# Patient Record
(demographics unavailable — no encounter records)

---

## 2024-10-09 NOTE — PHYSICAL EXAM
[Well Developed] : well developed [Well Nourished] : well nourished [No Acute Distress] : no acute distress [Normal Conjunctiva] : normal conjunctiva [Normal Venous Pressure] : normal venous pressure [No Carotid Bruit] : no carotid bruit [Normal S1, S2] : normal S1, S2 [No Murmur] : no murmur [No Rub] : no rub [No Gallop] : no gallop [Irregularly Irregular] : irregularly irregular [Nonpitting Edema] : nonpitting edema present [Rt] : varicose veins of the right leg noted [Lt] : varicose veins of the left leg noted [Clear Lung Fields] : clear lung fields [Good Air Entry] : good air entry [No Respiratory Distress] : no respiratory distress  [Soft] : abdomen soft [Non Tender] : non-tender [No Masses/organomegaly] : no masses/organomegaly [Normal Bowel Sounds] : normal bowel sounds [Normal Gait] : normal gait [No Edema] : no edema [No Cyanosis] : no cyanosis [No Clubbing] : no clubbing [No Varicosities] : no varicosities [No Rash] : no rash [No Skin Lesions] : no skin lesions [Moves all extremities] : moves all extremities [No Focal Deficits] : no focal deficits [Normal Speech] : normal speech [Alert and Oriented] : alert and oriented [Normal memory] : normal memory

## 2024-10-09 NOTE — HISTORY OF PRESENT ILLNESS
[FreeTextEntry1] : Tiffanie presented to the office today for a cardiovascular evaluation.  She was last seen in the office in May.  She is now 75 years old, with a history of permanent atrial fibrillation.  She has a Medtronic pacemaker in VVI mode.  She has a history of minimal luminal irregularities of her coronary arteries based on cardiac catheterization in 2011.  She has mild to moderate valvular insufficiency.  She has a history of obesity with associated prediabetes and lower extremity edema.  She was evaluated by her primary care physician May 16, 2024.  She had been to the [facility] emergency department because of right lower quadrant discomfort.  She was not felt to have any active cardiovascular issues.  Her hypertension and atrial fibrillation appeared controlled, and medications were continued.  She more recently presented again to [facility] with several complaints including dizziness, headache and palpitations.  She had extensive evaluation including CT scanning of the chest, carotid sonography.  Her evaluation was unremarkable, and she was ultimately discharged with a diagnosis of possible dehydration.  At baseline, she tries to stay physically active.  Her activities are somewhat limited by knee issues and obesity.  With regular physical activities, she feels well, without reproducible chest discomfort or shortness of breath suggestive of angina.  She denies orthopnea, PND.  She has a tendency toward lower extremity edema attributable to venous insufficiency.  She tries to use elevation and takes furosemide occasionally.  She denies palpitations, dizziness and syncope.She retired in June.  She is planned for left TKR in December, 2024. She is also planned for a generator change October 30.

## 2024-10-09 NOTE — DISCUSSION/SUMMARY
[FreeTextEntry1] : Lipid profile from April 2024 revealed a total cholesterol of 174, HDL 60, triglycerides 87, calculated LDL 98.  Echocardiography was performed April 5, 2024.  This revealed a normal ejection fraction with mild mitral and mild to moderate tricuspid regurgitation.  Device interrogation from September, 2024 revealed normal device function with estimated battery longevity of 11 months.  For now, I do not think any changes are needed.  I do not think any additional testing is needed at this time.  She is optimized for her planned generator change.  Holding Eliquis will be at the discretion of electrophysiology.  She will be seeing me again prior to her planned knee replacement. [EKG obtained to assist in diagnosis and management of assessed problem(s)] : EKG obtained to assist in diagnosis and management of assessed problem(s)

## 2024-11-11 NOTE — HISTORY OF PRESENT ILLNESS
[FreeTextEntry1] : Tiffanie presented to the office today for a cardiovascular evaluation.  She was last seen in the office about 1 month ago.  She is now 75 years old, with a history of permanent atrial fibrillation.  She has a Medtronic pacemaker in VVI mode.  She has a history of minimal luminal irregularities of her coronary arteries based on cardiac catheterization in 2011.  She has mild to moderate valvular insufficiency.  She has a history of obesity with associated prediabetes and lower extremity edema.  She was evaluated by her primary care physician May 16, 2024.  She had been to the [facility] emergency department because of right lower quadrant discomfort.  She was not felt to have any active cardiovascular issues.  Her hypertension and atrial fibrillation appeared controlled, and medications were continued.  She more recently presented again to [facility] with several complaints including dizziness, headache and palpitations.  She had extensive evaluation including CT scanning of the chest, carotid sonography.  Her evaluation was unremarkable, and she was ultimately discharged with a diagnosis of possible dehydration.  I saw her in October 2024, feeling well.  She was planned for generator change on October 30, 2024.  This was ultimately performed without complication, and she now has an MRI compatible device.  She now presents in anticipation of left total knee replacement in December, 2024.  At baseline, she tries to stay physically active.  Her activities are limited by knee issues and obesity.  With regular physical activities, she feels well, without reproducible chest discomfort or shortness of breath suggestive of angina.  She denies orthopnea, PND.  She has a tendency toward lower extremity edema attributable to venous insufficiency.  She tries to use elevation and takes furosemide occasionally.  She denies palpitations, dizziness and syncope. She retired in June.

## 2024-11-11 NOTE — CARDIOLOGY SUMMARY
[de-identified] : May 30, 2024 atrial fibrillation October 9, 2024 atrial fibrillation November 11, 2024 atrial fibrillation, backup ventricular pacing

## 2024-11-11 NOTE — DISCUSSION/SUMMARY
[FreeTextEntry1] : Lipid profile from April 2024 revealed a total cholesterol of 174, HDL 60, triglycerides 87, calculated LDL 98.  Echocardiography was performed April 5, 2024.  This revealed a normal ejection fraction with mild mitral and mild to moderate tricuspid regurgitation.  Coronary angiography was performed in 2011 revealing no significant coronary artery disease.  For now, I do not think any changes are needed.  I do not think ischemic testing is necessary at this time, acknowledging that her exercise capacity exceeds 4 metabolic equivalents limited by noncardiac symptoms, and only by orthopedic issues.   I consider her optimized for her planned knee replacement.  Routine hemodynamic monitoring is recommended.  She may hold Eliquis for 2 days prior to the procedure, and resume it when it is safe to do so from a surgical perspective. [EKG obtained to assist in diagnosis and management of assessed problem(s)] : EKG obtained to assist in diagnosis and management of assessed problem(s)

## 2024-11-11 NOTE — PHYSICAL EXAM
[Well Developed] : well developed [Well Nourished] : well nourished [No Acute Distress] : no acute distress [Normal Conjunctiva] : normal conjunctiva [Normal Venous Pressure] : normal venous pressure [No Carotid Bruit] : no carotid bruit [Normal S1, S2] : normal S1, S2 [No Murmur] : no murmur [No Rub] : no rub [No Gallop] : no gallop [Irregularly Irregular] : irregularly irregular [Nonpitting Edema] : nonpitting edema present [Rt] : varicose veins of the right leg noted [Lt] : varicose veins of the left leg noted [Good Air Entry] : good air entry [Clear Lung Fields] : clear lung fields [No Respiratory Distress] : no respiratory distress  [Soft] : abdomen soft [Non Tender] : non-tender [No Masses/organomegaly] : no masses/organomegaly [Normal Bowel Sounds] : normal bowel sounds [Normal Gait] : normal gait [No Edema] : no edema [No Cyanosis] : no cyanosis [No Clubbing] : no clubbing [No Varicosities] : no varicosities [No Rash] : no rash [No Skin Lesions] : no skin lesions [Moves all extremities] : moves all extremities [No Focal Deficits] : no focal deficits [Normal Speech] : normal speech [Alert and Oriented] : alert and oriented [Normal memory] : normal memory

## 2024-12-19 NOTE — HEALTH RISK ASSESSMENT
[Intercurrent ED visits] : went to ED [No] : No [No falls in past year] : Patient reported no falls in the past year [0] : 2) Feeling down, depressed, or hopeless: Not at all (0) [PHQ-2 Negative - No further assessment needed] : PHQ-2 Negative - No further assessment needed [[id number]] : 0

## 2025-01-06 NOTE — ASSESSMENT
[FreeTextEntry1] : Mrs. Vance is a 75-year-old woman with permanent atrial fibrillation sick sinus syndrome status post permanent pacemaker recent permanent pacemaker generator who presents with palpitations.  Her pacemaker was recently reprogrammed for the same complaints from VVIR to a VVI mode but it has not helped her symptoms at all. Pacemaker interrogation reveals normal device function without significant arrhythmia detection, other than her permanent atrial fibrillation.  Pacing was performed at a high voltage in both bipolar and unipolar configurations without triggering palpitations pacemaker function in the free running state was observed and occasional paced beats occurred with underlying chronic AF no significant PVCs.  I reprogrammed the pacemaker to VVIR mode.  She reports that she has been under a great deal of stress recently has been feeling significant amount of anxiety as well.

## 2025-01-06 NOTE — CARDIOLOGY SUMMARY
[de-identified] : 4/5/24 TTE 1. Left ventricular systolic function is normal with ejection fraction estimated at 55 to 60 %. 2. The left atrium is mildly dilated. 3. Mild mitral regurgitation. 4. Mild to moderate tricuspid regurgitation. 5. There is a device lead seen in the right atrium.

## 2025-01-06 NOTE — HISTORY OF PRESENT ILLNESS
[FreeTextEntry1] : Mrs. Vance is a 75-year-old woman with permanent atrial fibrillation sick sinus syndrome status post permanent pacemaker recent permanent pacemaker generator who presents with palpitations.  Her pacemaker was recently reprogrammed for the same complaints from VVIR to a VVI mode but it has not helped her symptoms at all.

## 2025-01-06 NOTE — CARDIOLOGY SUMMARY
[de-identified] : 4/5/24 TTE 1. Left ventricular systolic function is normal with ejection fraction estimated at 55 to 60 %. 2. The left atrium is mildly dilated. 3. Mild mitral regurgitation. 4. Mild to moderate tricuspid regurgitation. 5. There is a device lead seen in the right atrium.

## 2025-01-17 NOTE — HISTORY OF PRESENT ILLNESS
[TextBox_4] :  CAILIN GRAVES 75-year-old female NEW PATIENT VISIT seen in clinic for Pulmonary Evaluation of H/O  New c/o thick yellow/greenish phlegm x 6 weeks ago. + Intermittent Cough, clearing throat, severe PND, runny nose Suffers from allergic rhinitis and assumed it was that, but didn't resolve. PMHx: Chronic Bronchitis, Chronic Sinusitis, multiple hospitalizations for pneumonia  COVID, RSV, Flu, Pneumovax

## 2025-01-17 NOTE — PHYSICAL EXAM
[No Acute Distress] : no acute distress [Nasal congestion] : nasal congestion [Turbinate hypertrophy] : turbinate hypertrophy

## 2025-01-17 NOTE — DISCUSSION/SUMMARY
[FreeTextEntry1] : 1. Acute Recurrent Sinusitis--> Stat Azelastine nasal spray as directed, Prednisone 5 mg x 5 days, and Doxycycline 100 mg, BID x 10 days.  2. Obesity Type 2--> Referred to GI weight mgmt. and bariatric surgery center. 3. Pulmonary Nodules-->

## 2025-01-17 NOTE — REVIEW OF SYSTEMS
[Nasal Congestion] : nasal congestion [Postnasal Drip] : postnasal drip [Sinus Problems] : sinus problems [Cough] : cough [Fever] : no fever [Chills] : no chills [Chest Tightness] : no chest tightness [Sputum] : no sputum [Wheezing] : no wheezing [SOB on Exertion] : no sob on exertion [Chest Discomfort] : no chest discomfort [Palpitations] : no palpitations [Seasonal Allergies] : seasonal allergies [Nasal Discharge] : nasal discharge [Negative] : Musculoskeletal

## 2025-02-27 NOTE — HISTORY OF PRESENT ILLNESS
[FreeTextEntry1] : Tiffanie presented to the office today for a cardiovascular evaluation.  She was last seen in the office about 3 months ago.  She is now 75 years old, with a history of permanent atrial fibrillation.  She has a Medtronic pacemaker in VVI mode.  She has a history of minimal luminal irregularities of her coronary arteries based on cardiac catheterization in 2011.  She has mild to moderate valvular insufficiency.  She has a history of obesity with associated prediabetes and lower extremity edema. She has been intolerant to atorvastatin  She was evaluated by her primary care physician May 16, 2024.  She had been to the [facility] emergency department because of right lower quadrant discomfort.  She was not felt to have any active cardiovascular issues.  Her hypertension and atrial fibrillation appeared controlled, and medications were continued.  She more recently presented again to [facility] with several complaints including dizziness, headache and palpitations.  She had extensive evaluation including CT scanning of the chest, carotid sonography.  Her evaluation was unremarkable, and she was ultimately discharged with a diagnosis of possible dehydration.  I saw her in October 2024, feeling well.  She was planned for generator change on October 30, 2024.  This was ultimately performed without complication, and she now has an MRI compatible device.  She was reevaluated in November, 2024 in anticipation of left total knee replacement in December, 2024.  I did not feel that additional testing was necessary prior to the procedure.  This was ultimately performed without complication.  At baseline, she tries to stay physically active.  Her activities are limited by knee issues and obesity.  With regular physical activities, she feels well, without reproducible chest discomfort or shortness of breath suggestive of angina.  She denies orthopnea, PND.  She has a tendency toward lower extremity edema attributable to venous insufficiency.  She tries to use elevation and takes furosemide occasionally.  She denies palpitations, dizziness and syncope.    She stopped Repatha, primarily because of cost concerns, and is interested in trying a statin again if possible.  She had been planning on knee surgery, but deferred it.

## 2025-02-27 NOTE — DISCUSSION/SUMMARY
[FreeTextEntry1] : Lipid profile from April 2024 revealed a total cholesterol of 174, HDL 60, triglycerides 87, calculated LDL 98.  Echocardiography was performed April 5, 2024.  This revealed a normal ejection fraction with mild mitral and mild to moderate tricuspid regurgitation.  Coronary angiography was performed in 2011 revealing no significant coronary artery disease.    We discussed options in terms of cholesterol management, given her inability to pay for Repatha.  She will start rosuvastatin 5 mg daily.  Hopefully she will tolerate this.  If she does, she will have a fasting lipid profile and LFTs in about 6 weeks.  She will have blood work done this morning.  We discussed the possibility of using a GLP-1 agent for weight loss.  She is not diabetic, and the cost would be prohibitive at this time.  We will see how her blood work comes out.  She will plan to see me in about 6 months.  We discussed the possibility of ischemic testing prior to her knee surgery when that is required in some time.  She is unable to tolerate nuclear stress testing, and we may need to consider a coronary CT angiogram instead. [EKG obtained to assist in diagnosis and management of assessed problem(s)] : EKG obtained to assist in diagnosis and management of assessed problem(s)

## 2025-02-27 NOTE — CARDIOLOGY SUMMARY
[de-identified] : May 30, 2024 atrial fibrillation October 9, 2024 atrial fibrillation November 11, 2024 atrial fibrillation, backup ventricular pacing February 27, 2025 atrial fibrillation, backup ventricular pacing

## 2025-03-12 NOTE — HISTORY OF PRESENT ILLNESS
[FreeTextEntry1] : The patient has a history of atypical duct hyperplasia and fibrocystic breasts.   She had a new pacemaker inserted Nov 1.  It has bothered her since it was inserted, and she can easily feel it unlike her previous one.  She thought she felt something under her axilla a few weeks ago and wasn't sure if it was the pacemaker.  Her Cardiologist told her it was not.  She then also thought she might have felt something in her lower left breast.  She doesn't feel either one anymore.  She thinks the pacemaker is now MRI compatible.

## 2025-03-12 NOTE — PROCEDURE
[FreeTextEntry1] : Left axillary ultrasound [FreeTextEntry2] : Assess palpable area of concern [FreeTextEntry3] : The left axilla shows no suspicious mass.  There are several lymph nodes with thin cortices and fatty christopher ranging in size from 5-12 mm.

## 2025-03-12 NOTE — PAST MEDICAL HISTORY
[Menarche Age ____] : age at menarche was [unfilled] [Menopause Age____] : age at menopause was [unfilled] [Total Preg ___] : G[unfilled] [Live Births ___] : P[unfilled]  [Age At Live Birth ___] : Age at live birth: [unfilled] [History of Hormone Replacement Treatment] : has no history of hormone replacement treatment [FreeTextEntry7] : no

## 2025-03-12 NOTE — PHYSICAL EXAM
[EOMI] : extra ocular movement intact [Sclera nonicteric] : sclera nonicteric [Conjunctiva pink] : conjunctiva pink [Supple] : supple [No Supraclavicular Adenopathy] : no supraclavicular adenopathy [No Cervical Adenopathy] : no cervical adenopathy [Clear to Auscultation Bilat] : clear to auscultation bilaterally [Examined in the supine and seated position] : examined in the supine and seated position [No dominant masses] : no dominant masses in right breast  [No dominant masses] : no dominant masses left breast [No Nipple Retraction] : no left nipple retraction [No Nipple Discharge] : no left nipple discharge [No Axillary Lymphadenopathy] : no left axillary lymphadenopathy [Soft] : abdomen soft [Not Tender] : non-tender [No Palpable Masses] : no abdominal mass palpated [de-identified] : Pacemaker left upper chest. Oblique scar. [de-identified] : Scar UOQ and circumareolar.

## 2025-03-12 NOTE — CONSULT LETTER
[Dear  ___] : Dear  [unfilled], [Consult Letter:] : I had the pleasure of evaluating your patient, [unfilled]. [Sincerely,] : Sincerely, [DrEduardo  ___] : Dr. CELAYA

## 2025-03-12 NOTE — DATA REVIEWED
[FreeTextEntry1] :  Bilateral mammogram ([facility])  8/12/2024  No evidence of malignancy.

## 2025-03-13 NOTE — ASSESSMENT
[FreeTextEntry1] : Mrs. Vance is a 75-year-old woman with permanent atrial fibrillation sick sinus syndrome status post permanent pacemaker recent permanent pacemaker generator who presented with palpitations.    She has not had any significant palpitations since last visit.   Runs classed as NSVT < 1 sec duration likely rapid AF.

## 2025-03-13 NOTE — CARDIOLOGY SUMMARY
[de-identified] : 4/5/24 TTE 1. Left ventricular systolic function is normal with ejection fraction estimated at 55 to 60 %. 2. The left atrium is mildly dilated. 3. Mild mitral regurgitation. 4. Mild to moderate tricuspid regurgitation. 5. There is a device lead seen in the right atrium.

## 2025-03-13 NOTE — HISTORY OF PRESENT ILLNESS
[FreeTextEntry1] : Mrs. Vance is a 75-year-old woman with permanent atrial fibrillation sick sinus syndrome status post permanent pacemaker recent permanent pacemaker generator who presents with palpitations.  She has not had any significant palpitations since last visit.  CALIIN   denies chest pain, chest pressure, shortness of breath, lightheadedness, dizziness, palpitations, syncope, presyncope, orthopnea, PND, or edema.

## 2025-04-30 NOTE — REASON FOR VISIT
[Initial Visit ___] : an initial visit for [unfilled] [Questionnaire Received] : Patient questionnaire received [Intake Form Reviewed] : Patient intake form with past medical history, surgical history, family history and social history reviewed today [Pelvic Organ Prolapse] : pelvic organ prolapse [Urine Frequency] : urine frequency

## 2025-05-02 NOTE — END OF VISIT
[FreeTextEntry4] :  Yessenia TAVAREZ, am scribing for Dr. Mitchell who was present for the following sections HISTORY OF PRESENT ILLNESS, PAST MEDICAL/FAMILY/SOCIAL HISTORY; REVIEW OF SYSTEMS; VITAL SIGNS; PHYSICAL EXAM; DISPOSITION [FreeTextEntry3] : I personally performed the services described in the documentation, reviewed the documentation recorded by the scribe in my presence and it accurately and completely records my words and actions

## 2025-05-02 NOTE — ASSESSMENT
[FreeTextEntry1] :  I reviewed the above findings with the patient with visual illustrations. Treatment options for the prolapse were discussed and included doing nothing, Kegel exercises and behavioral modification, a pessary, or surgical correction. Surgically we discussed the abdominal vs the vaginal routes. Abdominally we discussed a hysterectomy and a sacral colpopexy laparoscopically and robotically. Vaginally we discussed a vaginal hysterectomy, uterosacral suspension, and anterior/posterior repair. Surgically we discussed hysteropexy as well as the use of biologics.  We also discussed vaginal closure however she is sexually active and therefore likely not a candidate. She is interested in surgical correction and we discussed returning for urodynamic testing for further evaluation of her urinary symptoms as well as to rule out occult stress urinary incontinence.  [facility] patient information on prolapse, uterine preservation surgery, sacrospinus ligament suspension, and anterior and posterior repair were provided.  All questions were answered

## 2025-05-02 NOTE — HISTORY OF PRESENT ILLNESS
[Vaginal Wall Prolapse] : no [Rectal Prolapse] : severe [Unable To Restrain Bowel Movement] : no [Feelings Of Urinary Urgency] : no [Pain During Urination (Dysuria)] : no [Urinary Frequency] : no [Urinary Tract Infection] : mild [FreeTextEntry5] : Daily, sometimes digitally reduces [de-identified] : Is currently sexually active [FreeTextEntry1] : Reviewed records from [facility].  Transabdominal US (5/14/24): Patient declined transvaginal imaging. Uterus 6.3 x 2.3 x 5.0 cm. Within normal limits. Endometrium 5mm. Within normal limits. The ovaries are not identified. There is no adnexal mass. No free fluid.

## 2025-05-02 NOTE — PROCEDURE
[Straight Catheterization] : insertion of a straight catheter [FreeTextEntry1] :  A catheterized urine was performed to rule out urinary tract infection and/or retention.

## 2025-05-02 NOTE — ASSESSMENT
[FreeTextEntry1] :  I reviewed the above findings with the patient with visual illustrations. Treatment options for the prolapse were discussed and included doing nothing, Kegel exercises and behavioral modification, a pessary, or surgical correction. Surgically we discussed the abdominal vs the vaginal routes. Abdominally we discussed a hysterectomy and a sacral colpopexy laparoscopically and robotically. Vaginally we discussed a vaginal hysterectomy, uterosacral suspension, and anterior/posterior repair. Surgically we discussed hysteropexy as well as the use of biologics.  We also discussed vaginal closure however she is sexually active and therefore likely not a candidate. She is interested in surgical correction and we discussed returning for urodynamic testing for further evaluation of her urinary symptoms as well as to rule out occult stress urinary incontinence.  [facility] patient information on prolapse, uterine preservation surgery, sacrospinus ligament suspension, and anterior and posterior repair were provided.  All questions were answered

## 2025-05-02 NOTE — PHYSICAL EXAM
[No Acute Distress] : in no acute distress [Well developed] : well developed [Well Nourished] : ~L well nourished [Good Hygeine] : demonstrates good hygeine [Normal Mood/Affect] : mood and affect are normal [Vulvar Atrophy] : vulvar atrophy [Labia Majora] : were normal [Labia Minora] : were normal [Normal Appearance] : general appearance was normal [Atrophy] : atrophy [Normal] : normal [Uterine Adnexae] : were not tender and not enlarged [MA] : MA [Cystocele] : a cystocele [Uterine Prolapse] : uterine prolapse [Aa ____] : Aa [unfilled] [Ba ____] : Ba [unfilled] [C ____] : C [unfilled] [GH ____] : GH [unfilled] [PB ____] : PB [unfilled] [TVL ____] : TVL  [unfilled] [Ap ____] : Ap [unfilled] [Bp ____] : Bp [unfilled] [D ____] : D [unfilled] [Post Void Residual ____ml] : post void residual was [unfilled] ml [FreeTextEntry2] : Kamini [Anxiety] : patient is not anxious [Tenderness] : ~T no ~M abdominal tenderness observed [Distended] : not distended [FreeTextEntry3] : Urethral hypermobility

## 2025-05-02 NOTE — HISTORY OF PRESENT ILLNESS
[Vaginal Wall Prolapse] : no [Rectal Prolapse] : severe [Unable To Restrain Bowel Movement] : no [Feelings Of Urinary Urgency] : no [Pain During Urination (Dysuria)] : no [Urinary Frequency] : no [Urinary Tract Infection] : mild [FreeTextEntry5] : Daily, sometimes digitally reduces [de-identified] : Is currently sexually active [FreeTextEntry1] : Reviewed records from [facility].  Transabdominal US (5/14/24): Patient declined transvaginal imaging. Uterus 6.3 x 2.3 x 5.0 cm. Within normal limits. Endometrium 5mm. Within normal limits. The ovaries are not identified. There is no adnexal mass. No free fluid.

## 2025-05-19 NOTE — CARDIOLOGY SUMMARY
[de-identified] : May 30, 2024 atrial fibrillation October 9, 2024 atrial fibrillation November 11, 2024 atrial fibrillation, backup ventricular pacing February 27, 2025 atrial fibrillation, backup ventricular pacing

## 2025-05-19 NOTE — HISTORY OF PRESENT ILLNESS
[FreeTextEntry1] : Tiffanie presented to the office today for a cardiovascular evaluation.  She was last seen in the office about 3 months ago.  She is now 75 years old, with a history of permanent atrial fibrillation.  She has a Medtronic pacemaker in VVI mode.  She has a history of minimal luminal irregularities of her coronary arteries based on cardiac catheterization in 2011.  She has mild to moderate valvular insufficiency.  She has a history of obesity with associated prediabetes and lower extremity edema. She has been intolerant to atorvastatin  She was evaluated by her primary care physician May 16, 2024.  She had been to the [facility] emergency department because of right lower quadrant discomfort.  She was not felt to have any active cardiovascular issues.  Her hypertension and atrial fibrillation appeared controlled, and medications were continued.  She more recently presented again to [facility] with several complaints including dizziness, headache and palpitations.  She had extensive evaluation including CT scanning of the chest, carotid sonography.  Her evaluation was unremarkable, and she was ultimately discharged with a diagnosis of possible dehydration.  I saw her in October 2024, feeling well.  She was planned for generator change on October 30, 2024.  This was ultimately performed without complication, and she now has an MRI compatible device.  She was reevaluated in November, 2024 in anticipation of left total knee replacement in December, 2024.  I did not feel that additional testing was necessary prior to the procedure.   I saw her in February, 2025, at which time she was feeling well.  She had stopped Repatha because of cost concerns, and was not enthusiastic about trying a statin again.  Ultimately, after discussion, we started rosuvastatin 5 mg daily.  We discussed weight loss.  We discussed the potential that she would need ischemic testing prior to planned knee surgery.  At baseline, she tries to stay physically active.  Her activities are limited by knee issues and obesity.  With regular physical activities, she feels well, without reproducible chest discomfort or shortness of breath suggestive of angina.  She denies orthopnea, PND.  She has a tendency toward lower extremity edema attributable to venous insufficiency.  She tries to use elevation and takes furosemide occasionally.  She denies palpitations, dizziness and syncope.    She stopped Repatha, primarily because of cost concerns, and is interested in trying a statin again if possible.  She had been planning on knee surgery, but deferred it. She is now thinking about getting this done in August.  She also has uterine prolapse which will need a procedure to address it.

## 2025-05-19 NOTE — DISCUSSION/SUMMARY
[FreeTextEntry1] : Lipid profile from April 2024 revealed a total cholesterol of 174, HDL 60, triglycerides 87, calculated LDL 98.  Echocardiography was performed April 5, 2024.  This revealed a normal ejection fraction with mild mitral and mild to moderate tricuspid regurgitation.  Coronary angiography was performed in 2011 revealing no significant coronary artery disease.  Device interrogation from March 2025 revealed normal device function with an estimated battery longevity of over 14 years.    We had previously discussed options other than Repatha, but she has proven intolerant to statins.  For now, she will continue Repatha every 2 weeks. We discussed the possibility of using a GLP-1 agent for weight loss.  She is not diabetic, and the cost would be prohibitive at this time.     In anticipation of planned surgeries, she will schedule a pharmacologic nuclear stress test.  Hopefully she will be able to tolerate this.  I will be in contact to discuss the results. [EKG obtained to assist in diagnosis and management of assessed problem(s)] : EKG obtained to assist in diagnosis and management of assessed problem(s)

## 2025-06-26 NOTE — HISTORY OF PRESENT ILLNESS
[FreeTextEntry1] : CAILIN is a 75 year female who presents for f/u on symptomatic POP and urinary frequency. Seen by me once in 2023. On initial exam, PVR normal, CSTs neg, stage III uterine POP on POPQ. She was interested in surgery.   UA w/ micro 05/14/2025 - 3 RBC, 14 Epithelial cells  HgbA1c 02/27/2025 - 6.2  CT Abdomen Pelvis 12/05/2024 - Kidneys/Ureters/Bladder unremarkable. Low position of uterus, indicating pelvic floor insufficiency. Subtle ground-glass haziness in the central mesentery which is nonspecific and can be of no clinical significance. However, this can be seen in the setting of mesenteric panniculitis, presenting with pain. 2.5 cm low-density lesion in the posterior right hepatic lobe, statistically most likely a benign hemangioma. 2.1 cm left adrenal nodule, statistically most likely a benign adenoma.   PMH pacemaker in situ, obesity, A-fib, mitral valve prolapse, HTN; hx bronchitis and pneumonia PSH rotator cuff x 2, exlap vs diagnostic lsc?, breast bx Never a smoker Allgs PCNs, cephalosporins, fluoroquinolones, nitrofurantoin, macrolides Meds include Eliquis.

## 2025-06-26 NOTE — DISCUSSION/SUMMARY
[FreeTextEntry1] : CAILIN is a 75 year female who presents for f/u on symptomatic POP and urinary frequency.   [] []   f/u All questions answered.

## 2025-06-26 NOTE — ADDENDUM
[FreeTextEntry1] : This note was written by Oksana Garnett, acting as the  for Dr. Castro. This note accurately reflects the work and decisions made by Dr. Castro.

## 2025-07-17 NOTE — HISTORY OF PRESENT ILLNESS
[FreeTextEntry8] : Pt has postnasal drip for several weeks. She felt feverish last night with chills. Didn't take temp. Also c/o scratchy throat

## 2025-07-21 NOTE — DISCUSSION/SUMMARY
[FreeTextEntry1] : Lipid profile from April 2024 revealed a total cholesterol of 174, HDL 60, triglycerides 87, calculated LDL 98.  Echocardiography was performed April 5, 2024.  This revealed a normal ejection fraction with mild mitral and mild to moderate tricuspid regurgitation.  Coronary angiography was performed in 2011 revealing no significant coronary artery disease.   Pharmacologic nuclear stress testing was performed June 4, 2025.  This revealed no evidence of ischemia, with an ejection fraction of 75%. Device interrogation from March 2025 revealed normal device function with an estimated battery longevity of over 14 years.  Device interrogation from June 2025, which revealed normal device function and estimated battery longevity of 13.5 years.    We had previously discussed options other than Repatha, but she has proven intolerant to statins.  For now, she will continue Repatha every 2 weeks. We discussed the possibility of using a GLP-1 agent for weight loss.  She is not diabetic, and the cost would be prohibitive at this time.   She has an appointment to see an endocrinologist at the end of the month, and she will bring it up with them.  In anticipation of planned surgeries, I consider her optimized.  No additional testing is needed. As she is not yet feeling better in terms of her upper respiratory issues, she will discuss things again with her primary care physician. [EKG obtained to assist in diagnosis and management of assessed problem(s)] : EKG obtained to assist in diagnosis and management of assessed problem(s)

## 2025-07-21 NOTE — CARDIOLOGY SUMMARY
[de-identified] : May 30, 2024 atrial fibrillation October 9, 2024 atrial fibrillation November 11, 2024 atrial fibrillation, backup ventricular pacing February 27, 2025 atrial fibrillation, backup ventricular pacing July 21, 2025 atrial fibrillation backup ventricular pacing

## 2025-07-21 NOTE — HISTORY OF PRESENT ILLNESS
[FreeTextEntry1] : Tiffanie presented to the office today for a cardiovascular evaluation.  She was last seen in the office about 2 months ago.  She is now 76 years old, with a history of permanent atrial fibrillation.  She has a Medtronic pacemaker in VVI mode.  She has a history of minimal luminal irregularities of her coronary arteries based on cardiac catheterization in 2011.  She has mild to moderate valvular insufficiency.  She has a history of obesity with associated prediabetes and lower extremity edema. She has been intolerant to atorvastatin  She was evaluated by her primary care physician May 16, 2024.  She had been to the [facility] emergency department because of right lower quadrant discomfort.  She was not felt to have any active cardiovascular issues.  Her hypertension and atrial fibrillation appeared controlled, and medications were continued.  She more recently presented again to [facility] with several complaints including dizziness, headache and palpitations.  She had extensive evaluation including CT scanning of the chest, carotid sonography.  Her evaluation was unremarkable, and she was ultimately discharged with a diagnosis of possible dehydration.  I saw her in October 2024, feeling well.  She was planned for generator change on October 30, 2024.  This was ultimately performed without complication, and she now has an MRI compatible device.  She was reevaluated in November, 2024 in anticipation of left total knee replacement in December, 2024.  I did not feel that additional testing was necessary prior to the procedure.   I saw her in February, 2025, at which time she was feeling well.  She had stopped Repatha because of cost concerns, and was not enthusiastic about trying a statin again.  Ultimately, after discussion, we started rosuvastatin 5 mg daily.  We discussed weight loss.  We discussed the potential that she would need ischemic testing prior to planned knee surgery.  She was evaluated in May 2025, feeling generally well.  She was agreeable to resuming Repatha every other week.  In anticipation of several potential surgeries I recommended a stress test.  Pharmacologic nuclear stress testing was performed June 4, 2025.  This revealed no evidence of ischemia, with an ejection fraction of 75%.  She had a very productive cough for several months. and saw Dr. Valdez July 17, 2025.  A chest x-ray was unremarkable.  She was started on antibiotics and an albuterol inhaler, but is not yet feeling better.  Her activities remain limited by knee issues and obesity.  With regular physical activities, she feels well, without reproducible chest discomfort or shortness of breath suggestive of angina.  She denies orthopnea, PND.  She has a tendency toward lower extremity edema attributable to venous insufficiency.  She tries to use elevation and takes furosemide occasionally.  She denies palpitations, dizziness and syncope.    She has been planning on knee surgery, but deferred it. Her orthopedist moved to a different practice and she is going there soon for another evaluation. She is now thinking about getting this done in August.  She also has uterine prolapse which will need a procedure to address it.